# Patient Record
Sex: MALE | Race: BLACK OR AFRICAN AMERICAN
[De-identification: names, ages, dates, MRNs, and addresses within clinical notes are randomized per-mention and may not be internally consistent; named-entity substitution may affect disease eponyms.]

---

## 2020-01-09 ENCOUNTER — HOSPITAL ENCOUNTER (EMERGENCY)
Dept: HOSPITAL 62 - ER | Age: 30
Discharge: HOME | End: 2020-01-09
Payer: COMMERCIAL

## 2020-01-09 VITALS — DIASTOLIC BLOOD PRESSURE: 67 MMHG | SYSTOLIC BLOOD PRESSURE: 140 MMHG

## 2020-01-09 DIAGNOSIS — R10.10: Primary | ICD-10-CM

## 2020-01-09 DIAGNOSIS — R11.2: ICD-10-CM

## 2020-01-09 LAB
ADD MANUAL DIFF: NO
ALBUMIN SERPL-MCNC: 4.7 G/DL (ref 3.5–5)
ALP SERPL-CCNC: 67 U/L (ref 38–126)
ANION GAP SERPL CALC-SCNC: 11 MMOL/L (ref 5–19)
AST SERPL-CCNC: 28 U/L (ref 17–59)
BASOPHILS # BLD AUTO: 0.1 10^3/UL (ref 0–0.2)
BASOPHILS NFR BLD AUTO: 1.1 % (ref 0–2)
BILIRUB DIRECT SERPL-MCNC: 0.2 MG/DL (ref 0–0.4)
BILIRUB SERPL-MCNC: 0.4 MG/DL (ref 0.2–1.3)
BUN SERPL-MCNC: 13 MG/DL (ref 7–20)
CALCIUM: 10 MG/DL (ref 8.4–10.2)
CHLORIDE SERPL-SCNC: 99 MMOL/L (ref 98–107)
CO2 SERPL-SCNC: 32 MMOL/L (ref 22–30)
EOSINOPHIL # BLD AUTO: 0.1 10^3/UL (ref 0–0.6)
EOSINOPHIL NFR BLD AUTO: 0.7 % (ref 0–6)
ERYTHROCYTE [DISTWIDTH] IN BLOOD BY AUTOMATED COUNT: 15 % (ref 11.5–14)
GLUCOSE SERPL-MCNC: 87 MG/DL (ref 75–110)
HCT VFR BLD CALC: 45.4 % (ref 37.9–51)
HGB BLD-MCNC: 15.5 G/DL (ref 13.5–17)
LYMPHOCYTES # BLD AUTO: 2.1 10^3/UL (ref 0.5–4.7)
LYMPHOCYTES NFR BLD AUTO: 23.5 % (ref 13–45)
MCH RBC QN AUTO: 28.9 PG (ref 27–33.4)
MCHC RBC AUTO-ENTMCNC: 34.1 G/DL (ref 32–36)
MCV RBC AUTO: 85 FL (ref 80–97)
MONOCYTES # BLD AUTO: 0.6 10^3/UL (ref 0.1–1.4)
MONOCYTES NFR BLD AUTO: 7.3 % (ref 3–13)
NEUTROPHILS # BLD AUTO: 5.9 10^3/UL (ref 1.7–8.2)
NEUTS SEG NFR BLD AUTO: 67.4 % (ref 42–78)
PLATELET # BLD: 312 10^3/UL (ref 150–450)
POTASSIUM SERPL-SCNC: 4.6 MMOL/L (ref 3.6–5)
PROT SERPL-MCNC: 8.6 G/DL (ref 6.3–8.2)
RBC # BLD AUTO: 5.36 10^6/UL (ref 4.35–5.55)
TOTAL CELLS COUNTED % (AUTO): 100 %
WBC # BLD AUTO: 8.8 10^3/UL (ref 4–10.5)

## 2020-01-09 PROCEDURE — 83690 ASSAY OF LIPASE: CPT

## 2020-01-09 PROCEDURE — 85025 COMPLETE CBC W/AUTO DIFF WBC: CPT

## 2020-01-09 PROCEDURE — 80053 COMPREHEN METABOLIC PANEL: CPT

## 2020-01-09 PROCEDURE — 99284 EMERGENCY DEPT VISIT MOD MDM: CPT

## 2020-01-09 PROCEDURE — S0119 ONDANSETRON 4 MG: HCPCS

## 2020-01-09 PROCEDURE — 36415 COLL VENOUS BLD VENIPUNCTURE: CPT

## 2020-01-09 NOTE — ER DOCUMENT REPORT
ED Medical Screen (RME)





- General


Chief Complaint: Vomiting


Stated Complaint: VOMITTING BLOOD


Time Seen by Provider: 01/09/20 17:24


Mode of Arrival: Ambulatory


Information source: Patient


Notes: 





Patient presents stating that he drank alcohol yesterday developed vomiting 

today.  Patient states he is vomited 3 times and has noticed some blood in his 

emesis.  Patient denies any abdominal pain or fever although does complain of 

feeling weak.





I have greeted and performed a rapid initial assessment of this patient.  A 

comprehensive ED assessment and evaluation of the patient, analysis of test 

results and completion of the medical decision making process will be conducted 

by additional ED providers.





Physical Exam





- Vital signs


Vitals: 





                                        











Temp Pulse Resp BP Pulse Ox


 


 98.5 F   70   18   144/90 H  98 


 


 01/09/20 16:36  01/09/20 16:36  01/09/20 16:36  01/09/20 16:36  01/09/20 16:36














- General


General appearance: Appears well, Alert


In distress: None





- Abdominal


Tenderness: Nontender





Course





- Vital Signs


Vital signs: 





                                        











Temp Pulse Resp BP Pulse Ox


 


 98.5 F   70   18   144/90 H  98 


 


 01/09/20 16:36  01/09/20 16:36  01/09/20 16:36  01/09/20 16:36  01/09/20 16:36

## 2020-01-09 NOTE — ER DOCUMENT REPORT
ED GI/





- General


Chief Complaint: Nausea/Vomiting


Stated Complaint: VOMITTING BLOOD


Time Seen by Provider: 01/09/20 17:24


Mode of Arrival: Ambulatory


Notes: 





Patient is a 29-year-old male that comes to the emergency department for chief 

complaint of upper abdominal pain and vomiting.  He vomited 4 times today, he 

states initially saw blood mixed in although the last 2 times he vomited he did 

not.  He did drink alcohol last night.  He states he has had the same problem 

twice before and he had negative work-up but was told he had gastritis.  He has 

never had endoscopy.  He denies any daily medications, surgeries, or past 

medical history otherwise.  He is a former smoker, takes no recreational drugs. 

He states that after getting Zofran he now feels much better and his symptoms 

are resolved.





- Related Data


Allergies/Adverse Reactions: 


                                        





No Known Allergies Allergy (Unverified 01/09/20 17:29)


   











Past Medical History





- General


Information source: Patient





- Social History


Smoking Status: Former Smoker


Smoking Education Provided: Yes - <3 min


Frequency of alcohol use: Social


Drug Abuse: None


Lives with: Family


Family History: Reviewed & Not Pertinent


Patient has suicidal ideation: No


Patient has homicidal ideation: No


GI Medical History: Reports: Hx Gastroesophageal Reflux Disease





Review of Systems





- Review of Systems


Constitutional: No symptoms reported


EENT: No symptoms reported


Cardiovascular: No symptoms reported


Respiratory: No symptoms reported


Gastrointestinal: See HPI


Genitourinary: No symptoms reported


Male Genitourinary: No symptoms reported


Musculoskeletal: No symptoms reported


Skin: No symptoms reported


Hematologic/Lymphatic: No symptoms reported


Neurological/Psychological: No symptoms reported





Physical Exam





- Vital signs


Vitals: 


                                        











Temp Pulse Resp BP Pulse Ox


 


 98.5 F   70   18   144/90 H  98 


 


 01/09/20 16:36  01/09/20 16:36  01/09/20 16:36  01/09/20 16:36  01/09/20 16:36














- Notes


Notes: 





GENERAL: Alert, interacts well. No acute distress.


HEAD: Normocephalic, atraumatic.


EYES: Pupils equal, round, and reactive to light. Extraocular movements intact.


ENT: Oral mucosa moist, tongue midline. Oropharynx unremarkable. Airway patent. 


LUNGS: Clear to auscultation bilaterally, no wheezes, rales, or rhonchi. No 

respiratory distress.


HEART: Regular rate and rhythm. No murmur


ABDOMEN: Soft, non-tender. Non-distended. Bowel sounds present in all 4 

quadrants.


GENITOURINARY: Deferred


EXTREMITIES: Moves all 4 extremities spontaneously. No edema, normal radial and 

dorsalis pedis pulses bilaterally. No cyanosis.


BACK: no cervical, thoracic, lumbar midline tenderness. No saddle anesthesia, 

normal distal neurovascular exam. Moves all extremities in full range of motion.


NEUROLOGICAL: Alert and oriented x3. Normal speech. Cranial nerves II through 

XII grossly intact. 


PSYCH: Normal affect, normal mood.


SKIN: Warm, dry, normal turgor. No rashes or lesions noted.





Course





- Re-evaluation


Re-evalutation: 


Patient has a soft benign abdomen, he is talkative, well-appearing, vital signs 

unremarkable.  He did vomit blood reportedly but has had emesis since without 

blood and has been tolerating p.o. without any difficulty.  I suspect gastritis 

as before, patient has multiple reasons for gastritis.  CBC, chemistry, lipase 

unremarkable.  Very low suspicion of acute abdomen or life-threatening bleed, 

patient will be treated for gastritis, discussed precautions, recommendations, 

return precautions at length.  Patient states understanding and agreement.  

Stable at time of discharge.





- Vital Signs


Vital signs: 


                                        











Temp Pulse Resp BP Pulse Ox


 


 98.0 F   61   16   140/67 H  95 


 


 01/09/20 21:20  01/09/20 21:20  01/09/20 21:20  01/09/20 21:20  01/09/20 21:20














- Laboratory


Result Diagrams: 


                                 01/09/20 18:22





                                 01/09/20 18:22


Laboratory results interpreted by me: 


                                        











  01/09/20 01/09/20





  18:22 18:22


 


RDW  15.0 H 


 


Carbon Dioxide   32 H


 


Total Protein   8.6 H














Discharge





- Discharge


Clinical Impression: 


 Upper abdominal pain





Vomiting


Qualifiers:


 Vomiting type: unspecified Vomiting Intractability: non-intractable Nausea 

presence: with nausea Qualified Code(s): R11.2 - Nausea with vomiting, 

unspecified





Condition: Stable


Disposition: HOME, SELF-CARE


Additional Instructions: 


Your laboratory work up including pancreas and liver testing is normal. 





Your symptoms and examination indicate gastritis/esophagitis (inflammation of 

your upper gastrointestinal tract).  Take Phenergan for nausea, take Carafate 

and Pepcid as prescribed to help treat this, you can take additional Rolaids, 

Tums, Maalox, etc. if needed.  You can take Tylenol for pain.  Avoid NSAIDs 

(Motrin/Avil/ibuprofen, Aleve/Naproxen, Aspirin, BC powder), alcohol, smoking, 

caffeine, spicy food.  Start with clear fluids, progress to bland diet.  





Follow-up with primary care for additional evaluation and treatment including 

possible H. pylori testing.  Return if you worsen including uncontrolled 

vomiting, vomiting blood, black stools, severe pain, fever of 100.4 or greater, 

or any other concerning or worsening symptoms.


Prescriptions: 


Sucralfate [Carafate 1 gm Tablet] 1 gm PO QID #20 tablet


Famotidine [Pepcid 20 mg Tablet] 20 mg PO BID #14 tablet


Promethazine HCl [Phenergan 25 mg Tablet] 25 mg PO Q6H PRN #15 tablet


 PRN Reason: